# Patient Record
Sex: MALE | URBAN - METROPOLITAN AREA
[De-identification: names, ages, dates, MRNs, and addresses within clinical notes are randomized per-mention and may not be internally consistent; named-entity substitution may affect disease eponyms.]

---

## 2022-09-06 ENCOUNTER — EMERGENCY (EMERGENCY)
Facility: HOSPITAL | Age: 22
LOS: 1 days | Discharge: LEFT BEFORE DISCHARGE | End: 2022-09-06
Attending: EMERGENCY MEDICINE | Admitting: EMERGENCY MEDICINE

## 2022-09-06 VITALS
SYSTOLIC BLOOD PRESSURE: 156 MMHG | DIASTOLIC BLOOD PRESSURE: 85 MMHG | TEMPERATURE: 98 F | HEIGHT: 71 IN | OXYGEN SATURATION: 100 % | HEART RATE: 89 BPM | WEIGHT: 169.98 LBS | RESPIRATION RATE: 18 BRPM

## 2022-09-06 PROCEDURE — 99282 EMERGENCY DEPT VISIT SF MDM: CPT

## 2022-09-06 PROCEDURE — 99053 MED SERV 10PM-8AM 24 HR FAC: CPT

## 2022-09-06 NOTE — ED ADULT TRIAGE NOTE - CHIEF COMPLAINT QUOTE
Pt presents to ED c/o intermittent left testicular pain with redness and swelling that began 9PM tonight.

## 2022-09-06 NOTE — ED PROVIDER NOTE - CLINICAL SUMMARY MEDICAL DECISION MAKING FREE TEXT BOX
21 male presented to ED for left testicular pain.  There is no ultrasound tech currently in this emergency department discussed options with patient which include transferring him to [facility] however he would prefer not to wait and he would rather go to [facility]'s emergency department to be seen.  Called [facility] and they confirmed that they have ultrasound 24 hours.  Patient signed AMA paperwork as he understands the risk of leaving without confirmation on ultrasound include death of testicle.  Patient understands and would like to go.  Competent alert and oriented understands risks and benefits.

## 2022-09-06 NOTE — ED PROVIDER NOTE - PATIENT PORTAL LINK FT
You can access the FollowMyHealth Patient Portal offered by [facility] by registering at the following website: [email or web address]. By joining [employer]’s FollowMyHealth portal, you will also be able to view your health information using other applications (apps) compatible with our system.

## 2022-09-06 NOTE — ED PROVIDER NOTE - NS ED ROS FT
Review of Systems   Constitutional:  No Weight Change, No Fever, No Chills, No weakness    Cardiovascular:  No Chest Pain  Respiratory:  No SOB,   Gastrointestinal:  No Nausea, No Vomiting, No Diarrhea, No Constipation, No abdominal pain,  Genitourinary:  No Dysuria, No Urinary Frequency, No Hematuria, No Urinary Incontinence, L testicular pain, no penile pain or discharge  Musculoskeletal:  No Arthralgias, No Myalgias, No Joint Swelling, No Joint Stiffness,  Skin:  No rashes

## 2022-09-06 NOTE — ED PROVIDER NOTE - PHYSICAL EXAMINATION
Const: NAD  Eyes: PERRL, no conjunctival injection  HENT:  Neck supple without meningismus   CV: RRR, Warm, well-perfused extremities  RESP: CTA B/L, no tachypnea   GI: soft, non-tender, non-distended  : R testicle normal, L testicle non-swollen, no erythema, tenderness to palpation of L testicle, no hernias felt b/l  MSK: No gross deformities appreciated  Skin: Warm, dry. No rashes  Neuro: Alert, CNs II-XII grossly intact. Sensation and motor function of extremities grossly intact.  Psych: Appropriate mood and affect.

## 2022-09-06 NOTE — ED PROVIDER NOTE - OBJECTIVE STATEMENT
20 yo M with no PMH presents to ED for L testicular pain that started 2 hours prior to arrival. Patient states that is worse with palpation and movement.  He has not noticed any penile discharge or pain.  No testicular swelling.  No skin changes.  He went to urgent care who sent him to the emergency department for further evaluation management.  Patient states he has not been sexually active in the past few months.

## 2022-09-08 DIAGNOSIS — Z53.29 PROCEDURE AND TREATMENT NOT CARRIED OUT BECAUSE OF PATIENT'S DECISION FOR OTHER REASONS: ICD-10-CM

## 2022-09-08 DIAGNOSIS — N50.812 LEFT TESTICULAR PAIN: ICD-10-CM
